# Patient Record
Sex: FEMALE | Race: BLACK OR AFRICAN AMERICAN | NOT HISPANIC OR LATINO | Employment: FULL TIME | URBAN - METROPOLITAN AREA
[De-identification: names, ages, dates, MRNs, and addresses within clinical notes are randomized per-mention and may not be internally consistent; named-entity substitution may affect disease eponyms.]

---

## 2019-04-12 ENCOUNTER — TELEPHONE (OUTPATIENT)
Dept: OTOLARYNGOLOGY | Facility: CLINIC | Age: 64
End: 2019-04-12

## 2019-05-21 ENCOUNTER — TELEPHONE (OUTPATIENT)
Dept: OTOLARYNGOLOGY | Facility: CLINIC | Age: 64
End: 2019-05-21

## 2019-05-21 DIAGNOSIS — H93.19 TINNITUS, UNSPECIFIED LATERALITY: Primary | ICD-10-CM

## 2019-05-24 ENCOUNTER — OFFICE VISIT (OUTPATIENT)
Dept: OTOLARYNGOLOGY | Facility: CLINIC | Age: 64
End: 2019-05-24
Payer: OTHER GOVERNMENT

## 2019-05-24 ENCOUNTER — CLINICAL SUPPORT (OUTPATIENT)
Dept: AUDIOLOGY | Facility: CLINIC | Age: 64
End: 2019-05-24
Payer: OTHER GOVERNMENT

## 2019-05-24 VITALS — BODY MASS INDEX: 26.29 KG/M2 | HEIGHT: 63 IN | WEIGHT: 148.38 LBS

## 2019-05-24 DIAGNOSIS — D18.09 GLOMUS TYMPANICUM TUMOR: ICD-10-CM

## 2019-05-24 DIAGNOSIS — H93.12 SUBJECTIVE TINNITUS OF LEFT EAR: ICD-10-CM

## 2019-05-24 DIAGNOSIS — H93.293 ABNORMAL AUDITORY PERCEPTION OF BOTH EARS: Primary | ICD-10-CM

## 2019-05-24 PROCEDURE — 99212 OFFICE O/P EST SF 10 MIN: CPT | Mod: PBBFAC | Performed by: OTOLARYNGOLOGY

## 2019-05-24 PROCEDURE — 99999 PR PBB SHADOW E&M-EST. PATIENT-LVL II: ICD-10-PCS | Mod: PBBFAC,,, | Performed by: OTOLARYNGOLOGY

## 2019-05-24 PROCEDURE — 99999 PR PBB SHADOW E&M-EST. PATIENT-LVL II: CPT | Mod: PBBFAC,,, | Performed by: OTOLARYNGOLOGY

## 2019-05-24 PROCEDURE — 99204 PR OFFICE/OUTPT VISIT, NEW, LEVL IV, 45-59 MIN: ICD-10-PCS | Mod: S$PBB,,, | Performed by: OTOLARYNGOLOGY

## 2019-05-24 PROCEDURE — 99204 OFFICE O/P NEW MOD 45 MIN: CPT | Mod: S$PBB,,, | Performed by: OTOLARYNGOLOGY

## 2019-05-24 PROCEDURE — 92567 TYMPANOMETRY: CPT | Mod: PBBFAC | Performed by: AUDIOLOGIST

## 2019-05-24 PROCEDURE — 92557 COMPREHENSIVE HEARING TEST: CPT | Mod: PBBFAC | Performed by: AUDIOLOGIST

## 2019-05-24 RX ORDER — ALPRAZOLAM 1 MG/1
TABLET ORAL
COMMUNITY
Start: 2017-09-07 | End: 2020-10-15

## 2019-05-24 RX ORDER — PANTOPRAZOLE SODIUM 40 MG/1
80 TABLET, DELAYED RELEASE ORAL
COMMUNITY
Start: 2017-11-06

## 2019-05-24 RX ORDER — NYSTATIN 500000 [USP'U]/1
500000 TABLET, COATED ORAL
COMMUNITY
End: 2020-10-15

## 2019-05-24 NOTE — PROGRESS NOTES
Ms. Preston was seen today for a hearing evaluation.     Pure tone audiometry revealed essentially normal hearing, AU  SRT and PTA are in good agreement bilaterally.  Excellent speech discrimination scores bilaterally   Tympanometry revealed Type A (normal middle ear function), bilaterally      Recommendations:  1. Otologic Evaluation  2. Annual Audiogram or repeat audiogram as needed  3. Hearing Protection

## 2019-05-24 NOTE — PROGRESS NOTES
Subjective:       Patient ID: Jessenia Preston is a 64 y.o. female.    Chief Complaint: Hearing Loss (left ear/ muffled hearing)    HPI : Ref DR Medina.    Has L Glomus Tymp.    C/O HL/ Pul Tinn.    Rem TC 10 yrs ago.    Past Medical History: Patient has no past medical history on file.    Past Surgical History: Patient has no past surgical history on file.    Social History: Patient     Family History: family history is not on file.    Medications:   Current Outpatient Medications   Medication Sig    ALPRAZolam (XANAX) 1 MG tablet     blood sugar diagnostic (FREESTYLE LITE STRIPS) Strp     pantoprazole (PROTONIX) 40 MG tablet 80 mg.    apixaban (ELIQUIS) 5 mg Tab Take 5 mg by mouth.    nystatin (MYCOSTATIN) 500,000 unit Tab Take 500,000 Units by mouth.     No current facility-administered medications for this visit.        Allergies: Patient is allergic to codeine and penicillins.      Review of Systems   Constitutional: Negative for activity change, appetite change, chills, diaphoresis, fatigue, fever and unexpected weight change.   HENT: Positive for hearing loss and tinnitus. Negative for congestion, ear discharge, ear pain, facial swelling, nosebleeds, postnasal drip, rhinorrhea, sinus pressure, sneezing, sore throat, trouble swallowing and voice change.    Eyes: Negative for photophobia, pain, discharge, redness and visual disturbance.   Respiratory: Negative for cough, chest tightness, shortness of breath and wheezing.    Cardiovascular: Negative for chest pain and palpitations.   Gastrointestinal: Negative for abdominal pain, constipation, diarrhea and nausea.   Genitourinary: Negative for dysuria and frequency.   Musculoskeletal: Negative for arthralgias, back pain, gait problem, joint swelling, myalgias, neck pain and neck stiffness.   Skin: Negative for color change, pallor and rash.   Neurological: Negative for dizziness, tremors, seizures, syncope, facial asymmetry, speech difficulty,  weakness, light-headedness, numbness and headaches.   Hematological: Negative for adenopathy. Does not bruise/bleed easily.   Psychiatric/Behavioral: Negative for agitation, confusion, decreased concentration, dysphoric mood and sleep disturbance. The patient is not nervous/anxious and is not hyperactive.        Objective:      Physical Exam   Constitutional: She is oriented to person, place, and time. She appears well-developed and well-nourished. She is cooperative.  Non-toxic appearance. She does not have a sickly appearance. She does not appear ill. No distress.   HENT:   Head: Normocephalic and atraumatic. Not macrocephalic and not microcephalic. Head is without raccoon's eyes, without Levy's sign, without abrasion, without contusion, without laceration, without right periorbital erythema and without left periorbital erythema. Hair is normal.   Right Ear: Ear canal normal. No lacerations. No drainage, swelling or tenderness. No foreign bodies. No mastoid tenderness. Tympanic membrane is not injected, not scarred, not perforated, not erythematous, not retracted and not bulging. Tympanic membrane mobility is normal. No middle ear effusion. No hemotympanum. No decreased hearing is noted.   Left Ear: Ear canal normal. No lacerations. No drainage, swelling or tenderness. No foreign bodies. No mastoid tenderness. Tympanic membrane is not injected, not scarred, not perforated, not erythematous, not retracted and not bulging. Tympanic membrane mobility is normal.  No middle ear effusion. No hemotympanum. No decreased hearing is noted.   Ears:    Nose: No mucosal edema, rhinorrhea, nose lacerations, sinus tenderness, nasal deformity, septal deviation or nasal septal hematoma. No epistaxis.  No foreign bodies. Right sinus exhibits no maxillary sinus tenderness. Left sinus exhibits no maxillary sinus tenderness.       CT with ME lesion on Left C/W GT.         Eyes: Pupils are equal, round, and reactive to light.  Conjunctivae, EOM and lids are normal.   Neck: Normal range of motion. Neck supple. No JVD present. No tracheal tenderness and no muscular tenderness present. No neck rigidity. No tracheal deviation, no edema, no erythema and normal range of motion present. No thyroid mass and no thyromegaly present.   Cardiovascular: Normal rate and regular rhythm.   Pulmonary/Chest: Effort normal. No accessory muscle usage or stridor. No apnea, no tachypnea and no bradypnea. No respiratory distress.   Abdominal: Soft. Normal appearance.   Musculoskeletal: Normal range of motion.   Lymphadenopathy:        Head (right side): No submental, no submandibular, no tonsillar, no preauricular and no posterior auricular adenopathy present.        Head (left side): No submental, no submandibular, no tonsillar, no preauricular and no posterior auricular adenopathy present.     She has no cervical adenopathy.        Right cervical: No superficial cervical, no deep cervical and no posterior cervical adenopathy present.       Left cervical: No superficial cervical, no deep cervical and no posterior cervical adenopathy present.   Neurological: She is alert and oriented to person, place, and time. She displays no atrophy and no tremor. No cranial nerve deficit or sensory deficit. She exhibits normal muscle tone. She displays no seizure activity.   Skin: Skin is warm, dry and intact. No abrasion, no bruising, no burn, no ecchymosis, no laceration, no lesion and no rash noted. She is not diaphoretic. No erythema. No pallor.   Psychiatric: She has a normal mood and affect. Her behavior is normal. Judgment and thought content normal. Her speech is not rapid and/or pressured and not slurred. Cognition and memory are normal. She is communicative.   Nursing note and vitals reviewed.              Assessment:       1. Asymmetrical hearing loss of left ear    2. Glomus tympanicum tumor    3. Subjective tinnitus of left ear        Plan:         Discussed PA/  hypotympanic removal. AS    OP/ Gen.    I have explained the risks , benefits and alternatives of the procedure in detail. This includes infection, bleeding, further loss of hearing,tinnitus, failure to improve, chorda symptoms, dizziness and facial nerve problems. All questions have been answered.  The patient desires to proceed.    She will call.

## 2021-03-04 PROBLEM — D18.09 GLOMUS TYMPANICUM TUMOR: Status: ACTIVE | Noted: 2021-03-04

## 2021-03-04 PROBLEM — H90.12 CONDUCTIVE HEARING LOSS OF LEFT EAR WITH UNRESTRICTED HEARING OF RIGHT EAR: Status: ACTIVE | Noted: 2021-03-04

## 2021-03-04 PROBLEM — F41.9 ANXIETY: Status: ACTIVE | Noted: 2020-04-01

## 2021-03-04 PROBLEM — N17.9 AKI (ACUTE KIDNEY INJURY): Status: ACTIVE | Noted: 2021-03-04

## 2021-03-04 PROBLEM — J96.01 ACUTE RESPIRATORY FAILURE WITH HYPOXIA: Status: ACTIVE | Noted: 2021-03-04

## 2021-03-04 PROBLEM — I10 ESSENTIAL HYPERTENSION: Status: ACTIVE | Noted: 2020-04-01

## 2021-03-04 PROBLEM — C18.1 MALIGNANT NEOPLASM OF APPENDIX: Status: ACTIVE | Noted: 2018-03-16

## 2021-03-04 PROBLEM — E08.10 DIABETIC KETOACIDOSIS WITHOUT COMA ASSOCIATED WITH DIABETES MELLITUS DUE TO UNDERLYING CONDITION: Status: ACTIVE | Noted: 2021-03-04

## 2021-03-04 PROBLEM — Q89.01 ASPLENIA: Status: ACTIVE | Noted: 2020-04-01
